# Patient Record
Sex: FEMALE | Race: WHITE
[De-identification: names, ages, dates, MRNs, and addresses within clinical notes are randomized per-mention and may not be internally consistent; named-entity substitution may affect disease eponyms.]

---

## 2020-11-25 ENCOUNTER — HOSPITAL ENCOUNTER (OUTPATIENT)
Dept: HOSPITAL 62 - ER | Age: 80
Setting detail: OBSERVATION
LOS: 2 days | Discharge: HOME | End: 2020-11-27
Attending: INTERNAL MEDICINE | Admitting: INTERNAL MEDICINE
Payer: MEDICARE

## 2020-11-25 DIAGNOSIS — Z95.1: ICD-10-CM

## 2020-11-25 DIAGNOSIS — E66.9: ICD-10-CM

## 2020-11-25 DIAGNOSIS — R73.9: ICD-10-CM

## 2020-11-25 DIAGNOSIS — F03.90: ICD-10-CM

## 2020-11-25 DIAGNOSIS — Z82.49: ICD-10-CM

## 2020-11-25 DIAGNOSIS — I16.1: Primary | ICD-10-CM

## 2020-11-25 DIAGNOSIS — E78.5: ICD-10-CM

## 2020-11-25 DIAGNOSIS — Z91.14: ICD-10-CM

## 2020-11-25 DIAGNOSIS — R07.89: ICD-10-CM

## 2020-11-25 DIAGNOSIS — I25.709: ICD-10-CM

## 2020-11-25 DIAGNOSIS — I25.2: ICD-10-CM

## 2020-11-25 LAB
ADD MANUAL DIFF: NO
ALBUMIN SERPL-MCNC: 4.3 G/DL (ref 3.5–5)
ALP SERPL-CCNC: 96 U/L (ref 38–126)
ANION GAP SERPL CALC-SCNC: 8 MMOL/L (ref 5–19)
AST SERPL-CCNC: 18 U/L (ref 14–36)
BASOPHILS # BLD AUTO: 0.1 10^3/UL (ref 0–0.2)
BASOPHILS NFR BLD AUTO: 1 % (ref 0–2)
BILIRUB DIRECT SERPL-MCNC: 0.1 MG/DL (ref 0–0.4)
BILIRUB SERPL-MCNC: 0.3 MG/DL (ref 0.2–1.3)
BUN SERPL-MCNC: 18 MG/DL (ref 7–20)
CALCIUM: 10.4 MG/DL (ref 8.4–10.2)
CHLORIDE SERPL-SCNC: 107 MMOL/L (ref 98–107)
CK MB SERPL-MCNC: 1.15 NG/ML (ref ?–4.55)
CK SERPL-CCNC: 59 U/L (ref 30–135)
CO2 SERPL-SCNC: 24 MMOL/L (ref 22–30)
EOSINOPHIL # BLD AUTO: 0.1 10^3/UL (ref 0–0.6)
EOSINOPHIL NFR BLD AUTO: 1.7 % (ref 0–6)
ERYTHROCYTE [DISTWIDTH] IN BLOOD BY AUTOMATED COUNT: 15.2 % (ref 11.5–14)
GLUCOSE SERPL-MCNC: 157 MG/DL (ref 75–110)
HCT VFR BLD CALC: 37.1 % (ref 36–47)
HGB BLD-MCNC: 12.3 G/DL (ref 12–15.5)
LYMPHOCYTES # BLD AUTO: 1.5 10^3/UL (ref 0.5–4.7)
LYMPHOCYTES NFR BLD AUTO: 29.2 % (ref 13–45)
MCH RBC QN AUTO: 27 PG (ref 27–33.4)
MCHC RBC AUTO-ENTMCNC: 33.3 G/DL (ref 32–36)
MCV RBC AUTO: 81 FL (ref 80–97)
MONOCYTES # BLD AUTO: 0.3 10^3/UL (ref 0.1–1.4)
MONOCYTES NFR BLD AUTO: 5.4 % (ref 3–13)
NEUTROPHILS # BLD AUTO: 3.2 10^3/UL (ref 1.7–8.2)
NEUTS SEG NFR BLD AUTO: 62.7 % (ref 42–78)
PLATELET # BLD: 116 10^3/UL (ref 150–450)
POTASSIUM SERPL-SCNC: 4 MMOL/L (ref 3.6–5)
PROT SERPL-MCNC: 7.2 G/DL (ref 6.3–8.2)
RBC # BLD AUTO: 4.57 10^6/UL (ref 3.72–5.28)
TOTAL CELLS COUNTED % (AUTO): 100 %
TROPONIN I SERPL-MCNC: < 0.012 NG/ML
WBC # BLD AUTO: 5.1 10^3/UL (ref 4–10.5)

## 2020-11-25 PROCEDURE — 80048 BASIC METABOLIC PNL TOTAL CA: CPT

## 2020-11-25 PROCEDURE — 80061 LIPID PANEL: CPT

## 2020-11-25 PROCEDURE — 99285 EMERGENCY DEPT VISIT HI MDM: CPT

## 2020-11-25 PROCEDURE — G0378 HOSPITAL OBSERVATION PER HR: HCPCS

## 2020-11-25 PROCEDURE — 93005 ELECTROCARDIOGRAM TRACING: CPT

## 2020-11-25 PROCEDURE — 85025 COMPLETE CBC W/AUTO DIFF WBC: CPT

## 2020-11-25 PROCEDURE — 84484 ASSAY OF TROPONIN QUANT: CPT

## 2020-11-25 PROCEDURE — 93010 ELECTROCARDIOGRAM REPORT: CPT

## 2020-11-25 PROCEDURE — 84443 ASSAY THYROID STIM HORMONE: CPT

## 2020-11-25 PROCEDURE — 82553 CREATINE MB FRACTION: CPT

## 2020-11-25 PROCEDURE — 82550 ASSAY OF CK (CPK): CPT

## 2020-11-25 PROCEDURE — 71275 CT ANGIOGRAPHY CHEST: CPT

## 2020-11-25 PROCEDURE — 83036 HEMOGLOBIN GLYCOSYLATED A1C: CPT

## 2020-11-25 PROCEDURE — 36415 COLL VENOUS BLD VENIPUNCTURE: CPT

## 2020-11-25 PROCEDURE — 80053 COMPREHEN METABOLIC PANEL: CPT

## 2020-11-25 PROCEDURE — 71045 X-RAY EXAM CHEST 1 VIEW: CPT

## 2020-11-25 NOTE — RADIOLOGY REPORT (SQ)
EXAM DESCRIPTION: 



XR CHEST 1 VIEW



COMPLETED DATE/TME:  11/25/2020 19:54



CLINICAL HISTORY: 



80 years, Female, chest pain 



COMPARISON:

None.



NUMBER OF VIEWS:

1



TECHNIQUE:

Portable AP upright view of the chest was obtained at 7:50 PM.



LIMITATIONS:

None.



FINDINGS:



The heart size is within normal limits for technique. There is

either focal diaphragmatic eventration or small focal

diaphragmatic hernia medial left lower chest. There is no

airspace consolidation. Patient is status post coronary artery

bypass graft surgery. There is no evidence of pleural effusion or

pneumothorax. No definite acute bony abnormality is seen.



IMPRESSION:



No acute abnormality as above.

 



copyright 2011 [employer]- All Rights Reserved

## 2020-11-25 NOTE — PDOC H&P
History of Present Illness


Admission Date/PCP: 


  11/25/20 22:58





  TARA CREWS MD





Patient complains of: Chest pain


History of Present Illness: 


EILEEN KRISHNAMURTHY is a 80 year old female with history of hypertension, CAD S/P CABG

[2016 [facility]], dementia, depression, who presents to the hospital via EMS for 

evaluation of acute onset chest pain.  Patient's chest pain began today.  She 

described it like a pressure and tightness.  Patient herself is a poor 

historian.  She did inform her daughter and her daughter subsequently called 

EMS.  Patient endorsed having some shortness of breath at the time.  Currently 

patient still notes mild heaviness in the chest but mostly resolved.  Of note, 

patient has not been on any medications for the past several months including h

er blood pressure medications.  The daughter does not know what blood pressure 

medicationS she was previously on.  On arrival of the EMS, her blood pressure 

was noted to be 220/76.  She was given some nitro and subsequently placed on a 

nitro drip.  On arrival at the ER, patient's nitro drip was discontinued as her 

blood pressure had normalized.  








Past Medical History


Cardiac Medical History: Reports: Myocardial Infarction, Hyperlipidema, 

Hypertension


Psychiatric Medical History: Reports: Depression





Past Surgical History


Past Surgical History: Reports: Appendectomy, Cholecystectomy, Hysterectomy, 

Orthopedic Surgery - L femor, R big toe





Social History


Lives with: Family


Smoking Status: Unknown if Ever Smoked


Electronic Cigarette use?: No


Frequency of Alcohol Use: None


Hx Recreational Drug Use: No





- Advance Directive


Resuscitation Status: Full Code


Surrogate healthcare decision maker:: 





Dayna





Family History


Family History: Hypertension


Parental Family History Reviewed: Yes


Children Family History Reviewed: Yes


Sibling(s) Family History Reviewed.: Yes





Medication/Allergy


Allergies/Adverse Reactions: 


                                        





Sulfa (Sulfonamide Antibiotics) Allergy (Verified 11/25/20 21:22)


   











Review of Systems


Constitutional: ABSENT: chills, fever(s), weakness


Eyes: ABSENT: visual disturbances


Nose, Mouth, and Throat: ABSENT: headache(s), sore throat


Cardiovascular: PRESENT: chest pain


Respiratory: ABSENT: cough


Gastrointestinal: ABSENT: abdominal pain, nausea, vomiting


Genitourinary: ABSENT: difficulty urinating, dysuria


Musculoskeletal: ABSENT: back pain


Integumentary: ABSENT: diaphoresis


Neurological: ABSENT: dizziness


Psychiatric: PRESENT: depression - Daughter reports history of depression.  

ABSENT: anxiety


Endocrine: ABSENT: polyuria


Hematologic/Lymphatic: ABSENT: easy bleeding





Physical Exam


Vital Signs: 


                                        











Temp Pulse Resp BP Pulse Ox


 


 98.3 F      16   138/55 H  99 


 


 11/25/20 18:53     11/25/20 22:01  11/25/20 22:01  11/25/20 22:01








                                 Intake & Output











 11/24/20 11/25/20 11/26/20





 06:59 06:59 06:59


 


Weight   63.6 kg











General appearance: PRESENT: no acute distress, cooperative


Head exam: PRESENT: normocephalic


Mouth exam: ABSENT: neck supple


Neck exam: ABSENT: JVD


Respiratory exam: PRESENT: clear to auscultation adela, symmetrical, unlabored.  

ABSENT: tachypnea, wheezes


Cardiovascular exam: PRESENT: diastolic murmur, RRR, +S1, +S2.  ABSENT: systolic

murmur, tachycardia


GI/Abdominal exam: PRESENT: soft.  ABSENT: rebound, rigid, tenderness


Extremities exam: ABSENT: calf tenderness, pedal edema


Neurological exam: PRESENT: alert, awake, oriented to person, oriented to place.

 ABSENT: oriented to situation


Psychiatric exam: ABSENT: agitated, anxious


Focused psych exam: ABSENT: pressured speech





Results


Laboratory Results: 


                                        





                                 11/25/20 18:56 





                                 11/25/20 18:56 





                                        











  11/25/20 11/25/20





  18:56 18:56


 


WBC  5.1 


 


RBC  4.57 


 


Hgb  12.3 


 


Hct  37.1 


 


MCV  81 


 


MCH  27.0 


 


MCHC  33.3 


 


RDW  15.2 H 


 


Plt Count  116 L 


 


Seg Neutrophils %  62.7 


 


Sodium   138.9


 


Potassium   4.0


 


Chloride   107


 


Carbon Dioxide   24


 


Anion Gap   8


 


BUN   18


 


Creatinine   0.96


 


Est GFR (African Amer)   > 60


 


Glucose   157 H


 


Calcium   10.4 H


 


Total Bilirubin   0.3


 


AST   18


 


Alkaline Phosphatase   96


 


Total Protein   7.2


 


Albumin   4.3








                                        











  11/25/20 11/25/20 11/25/20





  18:56 18:56 22:15


 


Creatine Kinase  59  


 


CK-MB (CK-2)   1.15 


 


Troponin I   < 0.012  < 0.012











Impressions: 


                                        





Chest X-Ray  11/25/20 19:18


IMPRESSION:


 


No acute abnormality as above.


 


 


copyright 2011 [employer]- All Rights Reserved


 














Assessment and Plan





- Diagnosis


(1) Hypertensive emergency


Is this a current diagnosis for this admission?: Yes   


Plan: 


Secondary to noncompliance with antihypertensive regimen.


Patient initially hypertensive emergency as characterized by severe hypertension

with chest pain and dyspnea.


Currently off the nitroglycerin drip 


As she has not been as any antihypertensives for several months over a year now 

and no one knows her prior BP meds, I will go ahead and start her on losartan 

and chlorthalidone right away.


Admitted for observation.








(2) Chest pain at rest


Is this a current diagnosis for this admission?: Yes   


Plan: 


Due to malignant hypertension.  Chest pain has currently resolved mostly down to

1/5.


Troponin is negative x2.


Monitor on telemetry


Nitroglycerin as needed


Optimize blood pressure.








(3) CAD (coronary artery disease)


Is this a current diagnosis for this admission?: Yes   


Plan: 


Has history of MI with CABG in 2016.  Not on any medications and has not seen 

any doctor in quite some time now.  We will start her on a baby aspirin.  Check 

lipid panel.








(4) Hyperglycemia


Is this a current diagnosis for this admission?: Yes   


Plan: 


Denies history of diabetes.  Check hemoglobin A1c.








- Time


Time Spent with patient: 35 or more minutes


Anticipated Discharge Disposition: Home, Self Care


Anticipated Discharge Timeframe: within 24 hours

## 2020-11-25 NOTE — ER DOCUMENT REPORT
Entered by EWA DELVALLE SCRIBE  11/25/20 1952 





Acting as scribe for:JOSE MARIA FERREIRA, DO





ED General





- General


Chief Complaint: Chest Pain


Stated Complaint: CHEST PAIN


Time Seen by Provider: 11/25/20 19:42


Primary Care Provider: 


TARA CREWS MD [Primary Care Provider] - Follow up as needed


Information source: Patient


Notes: 





This 80 year old female patient presents to the emergency department today with 

complaints of chest pain. Patient states at 5 pm she had sudden tightness to the

center of her chest that did not radiate. Patient states she was laying on her b

ed when this began. Patient was administered aspirin and a nitro drip by EMS, 

her pain lessened, and states she feels better now. Patient reports history of 

open heart surgery, MI, HTN, HLD, triple by-pass, and states she cannot remember

when this occurred but it has been a while. Denies recent illness, cough, or 

covid exposure. 





- Related Data


Allergies/Adverse Reactions: 


                                        





Sulfa (Sulfonamide Antibiotics) Allergy (Verified 11/25/20 21:22)


   











Past Medical History





- General


Information source: Patient





- Social History


Smoking Status: Unknown if Ever Smoked


Lives with: Family


Family History: Reviewed & Not Pertinent





- Past Medical History


Cardiac Medical History: Reports: Hx Heart Attack, Hx Hypercholesterolemia, Hx 

Hypertension


Psychiatric Medical History: Reports: Hx Depression


Past Surgical History: Reports: Hx Appendectomy, Hx Cholecystectomy, Hx 

Hysterectomy, Hx Open Heart Surgery





Review of Systems





- Review of Systems


Constitutional: See HPI.  denies: Recent illness


EENT: No symptoms reported


Cardiovascular: See HPI, Chest pain


Respiratory: See HPI.  denies: Cough


Gastrointestinal: No symptoms reported


Genitourinary: No symptoms reported


Female Genitourinary: No symptoms reported


Musculoskeletal: No symptoms reported


Skin: No symptoms reported


Hematologic/Lymphatic: No symptoms reported


Neurological/Psychological: No symptoms reported


-: Yes All other systems reviewed and negative





Physical Exam





- Vital signs


Vitals: 


                                        











Temp


 


 98.3 F 


 


 11/25/20 18:53














- General


Notes: 


Alert. Frail and elderly appearance. 





- HEENT


Head: Normocephalic, Atraumatic


Eyes: Normal


Pupils: PERRL





- Respiratory


Respiratory status: No respiratory distress


Chest status: Nontender


Breath sounds: Normal


Chest palpation: Normal


Notes: 


Midline scar to the anterior chest. 





- Cardiovascular


Rhythm: Regular


Heart sounds: Normal auscultation


Murmur: No





- Abdominal


Inspection: Obese, Other - soft


Distension: No distension


Bowel sounds: Normal


Tenderness: Nontender





- Extremities


General upper extremity: Normal inspection, Normal ROM


General lower extremity: Normal inspection, Normal ROM.  No: Edema





- Neurological


Neuro grossly intact: Yes


Cognition: Normal


Orientation: AAOx4


Shelia Coma Scale Eye Opening: Spontaneous


Shelia Coma Scale Verbal: Oriented


Shelia Coma Scale Motor: Obeys Commands


Adkins Coma Scale Total: 15


Speech: Normal


Motor strength normal: LUE, RUE, LLE, RLE


Sensory: Normal





- Psychological


Associated symptoms: Normal affect, Normal mood





- Skin


Skin Temperature: Warm


Skin Moisture: Dry


Skin Color: Normal





Course





- Re-evaluation


Re-evalutation: 





11/25/20 22:15


MDM  80 year old female is a difficult historian and experienced chest pain at 

home earlier accompanied by accelerated htn.  She is taking no medicine though 

she is supposed to be.  She has had a CABG previously at [facility] and was 

taking htn medicine but not taking at this time.  She was administered aspirin -

325 mg by EMS and then ntg - sublingal which helped and then a ntg gtt was 

initiated.  She was painfree when she arrived here and sbp 140 upon arrival.  

Her SBP was 210 at home.  She remains pain free here.  I have discussed the pt 

with Dr. Cervantes and he has graciously agreed to see and evaluate for admission.  





- Vital Signs


Vital signs: 


                                        











Temp Pulse Resp BP Pulse Ox


 


 98.3 F      14   126/49 H  98 


 


 11/25/20 18:53     11/25/20 21:16  11/25/20 21:16  11/25/20 21:16














- Laboratory


Result Diagrams: 


                                 11/25/20 18:56





                                 11/25/20 18:56


Laboratory results interpreted by me: 


                                        











  11/25/20 11/25/20





  18:56 18:56


 


RDW  15.2 H 


 


Plt Count  116 L 


 


Est GFR (MDRD) Non-Af   56 L


 


Glucose   157 H


 


Calcium   10.4 H














Discharge





- Discharge


Clinical Impression: 


 Chest pain at rest





Hypertension


Qualifiers:


 Hypertension type: unspecified Qualified Code(s): I10 - Essential (primary) 

hypertension





Condition: Stable


Disposition: ADMITTED AS OBSERVATION


Admitting Provider: Helen (Hospitalist)


Unit Admitted: Telemetry


Referrals: 


TARA CREWS MD [Primary Care Provider] - Follow up as needed





I personally performed the services described in the documentation, reviewed and

edited the documentation which was dictated to the scribe in my presence, and it

accurately records my words and actions.

## 2020-11-25 NOTE — ADVANCED CARE
- Diagnosis


(1) Hypertensive emergency


Diagnosis Current: Yes   





(2) Chest pain at rest


Diagnosis Current: Yes   





(3) CAD (coronary artery disease)


Diagnosis Current: Yes   





Resuscitation Status: Full Code


Discussion: 





I discussed with patient's daughter Dayna is patient's next of kin and lives 

with patient.  Patient has history of dementia.  Patient's daughter informs me 

that she would like her to be a full CODE STATUS but does not want prolonged 

code as she does not want her to be resuscitated as a 'vegetable'.  After 

elaborate discussion and explanation, she did state that she does not want CPR 

past 8 minutes if no ROSC is achieved.  Otherwise wants full treatment as deemed

necessary.


Time Spent: 17mins

## 2020-11-25 NOTE — EKG REPORT
SEVERITY:- ABNORMAL ECG -

SINUS RHYTHM

LEFT VENTRICULAR HYPERTROPHY

:

Confirmed by: Tomas Hopkins MD 25-Nov-2020 19:33:56

## 2020-11-26 LAB
ANION GAP SERPL CALC-SCNC: 9 MMOL/L (ref 5–19)
BUN SERPL-MCNC: 17 MG/DL (ref 7–20)
CALCIUM: 9.9 MG/DL (ref 8.4–10.2)
CHLORIDE SERPL-SCNC: 109 MMOL/L (ref 98–107)
CHOLEST SERPL-MCNC: 194.4 MG/DL (ref 0–200)
CO2 SERPL-SCNC: 21 MMOL/L (ref 22–30)
GLUCOSE SERPL-MCNC: 151 MG/DL (ref 75–110)
LDLC SERPL DIRECT ASSAY-MCNC: 143 MG/DL (ref ?–100)
POTASSIUM SERPL-SCNC: 4.1 MMOL/L (ref 3.6–5)
TRIGL SERPL-MCNC: 155 MG/DL (ref ?–150)
VLDLC SERPL CALC-MCNC: 31 MG/DL (ref 10–31)

## 2020-11-26 RX ADMIN — Medication SCH ML: at 21:38

## 2020-11-26 RX ADMIN — LOSARTAN POTASSIUM SCH MG: 50 TABLET, FILM COATED ORAL at 10:55

## 2020-11-26 RX ADMIN — CHLORTHALIDONE SCH MG: 25 TABLET ORAL at 11:00

## 2020-11-26 RX ADMIN — Medication SCH: at 18:23

## 2020-11-26 RX ADMIN — ENOXAPARIN SODIUM SCH: 40 INJECTION SUBCUTANEOUS at 10:55

## 2020-11-26 RX ADMIN — Medication SCH ML: at 05:32

## 2020-11-26 RX ADMIN — ASPIRIN SCH MG: 81 TABLET, COATED ORAL at 10:54

## 2020-11-26 NOTE — EKG REPORT
SEVERITY:- ABNORMAL ECG -

SINUS BRADYCARDIA

LEFT VENTRICULAR HYPERTROPHY

:

Confirmed by: Tomas Hopkins MD 26-Nov-2020 09:17:10

## 2020-11-26 NOTE — RADIOLOGY REPORT (SQ)
EXAM DESCRIPTION:

 CTA CHEST



CLINICAL HISTORY: 

80 years  Female;  chest pain



TECHNIQUE: 

CT angiogram of the chest using intravenous contrast.. MIP

reconstructions were performed.  

All CT scans at this facility use dose modulation, iterative

reconstruction, and/or weight based dosing when appropriate to

reduce radiation dose to as low as reasonably achievable.



COMPARISON: None.



FINDINGS:

Chest:

Vascular:  Exam is of diagnostic quality. There is no evidence of

pulmonary artery embolization. Scattered vascular plaque is

present in the aorta. No aneurysm. There is postoperative change

of sternotomy and CABG. No dissection.

Lungs:  Lungs are clear. No focal consolidation. No pneumothorax

or pleural effusion. In the posterior aspect of the left upper

lobe along the fissure is a 4 mm pulmonary nodule. There is a

smaller adjacent 2 mm nodule. A 2 mm nodules present in the right

upper lobe and there are scattered very small pulmonary nodules

seen throughout the lungs bilaterally.

Mediastinum:  Mild four-chamber cardiac enlargement. Coronary

artery calcifications. No significant mediastinal or hilar

lymphadenopathy.

Bones and soft tissues:  Compression fractures noted at T10 with

70% loss of height of the vertebral body. Age unknown. There is

degenerative endplate changes with vacuum disc and sclerosis at

T12-L1. The sternum is healed.

Upper Abdomen:  Prominence of the common bile duct is identified

which measures 16 mm. The etiology is unknown.





IMPRESSION:



1. No pulmonary artery embolization.

2. Multiple small bilateral pulmonary nodules which measure up to

4 mm. No follow-up is indicated.

3. No acute process in the chest.

4. Biliary dilatation of uncertain etiology.

## 2020-11-27 VITALS — SYSTOLIC BLOOD PRESSURE: 125 MMHG | DIASTOLIC BLOOD PRESSURE: 44 MMHG

## 2020-11-27 RX ADMIN — ENOXAPARIN SODIUM SCH MG: 40 INJECTION SUBCUTANEOUS at 10:17

## 2020-11-27 RX ADMIN — ASPIRIN SCH MG: 81 TABLET, COATED ORAL at 10:17

## 2020-11-27 RX ADMIN — LOSARTAN POTASSIUM SCH MG: 50 TABLET, FILM COATED ORAL at 10:14

## 2020-11-27 RX ADMIN — Medication SCH: at 14:08

## 2020-11-27 RX ADMIN — CHLORTHALIDONE SCH MG: 25 TABLET ORAL at 08:55

## 2020-11-27 RX ADMIN — Medication SCH ML: at 05:37

## 2020-11-28 NOTE — PDOC PROGRESS REPORT
Subjective


Date:: 11/26/20


Subjective:: 





Patient was seen and examined at bedside. minimal chest pain no SOB. BP was down

to 130/80. No new complains


Reason For Visit: 


HTN EMERGENCY,CHEST PAIN








Physical Exam


Vital Signs: 


                                        











Temp Pulse Resp BP Pulse Ox


 


 97.8 F   62   17   125/44 L  100 


 


 11/27/20 12:33  11/27/20 12:33  11/27/20 12:33  11/27/20 12:33  11/27/20 12:33








                                 Intake & Output











 11/27/20 11/28/20 11/29/20





 06:59 06:59 06:59


 


Intake Total 580 120 


 


Balance 580 120 


 


Weight 65 kg  











General appearance: PRESENT: no acute distress, cooperative


Head exam: PRESENT: atraumatic, normocephalic


Eye exam: PRESENT: EOMI, PERRLA


Mouth exam: PRESENT: moist


Neck exam: PRESENT: full ROM


Respiratory exam: PRESENT: clear to auscultation adela, symmetrical, unlabored


Cardiovascular exam: PRESENT: RRR, +S1, +S2


Pulses: PRESENT: +2 pedal pulses bilateral


GI/Abdominal exam: PRESENT: normal bowel sounds, soft.  ABSENT: rebound, 

tenderness


Extremities exam: PRESENT: full ROM


Musculoskeletal exam: PRESENT: full ROM


Neurological exam: PRESENT: alert, awake, oriented to person, oriented to place,

oriented to time, oriented to situation


Psychiatric exam: PRESENT: normal mood


Skin exam: PRESENT: normal color





Results


Laboratory Results: 


                                        





                                 11/25/20 18:56 





                                 11/26/20 06:18 





                                        











  11/25/20 11/25/20 11/25/20





  18:56 18:56 22:15


 


Creatine Kinase  59  


 


CK-MB (CK-2)   1.15 


 


Troponin I   < 0.012  < 0.012











Impressions: 


                                        





Chest X-Ray  11/25/20 19:18


IMPRESSION:


 


No acute abnormality as above.


 


 


copyright 2011 [employer]- All Rights Reserved


 








Chest/Abdomen CTA  11/26/20 00:00


IMPRESSION:


 


1. No pulmonary artery embolization.


2. Multiple small bilateral pulmonary nodules which measure up to


4 mm. No follow-up is indicated.


3. No acute process in the chest.


4. Biliary dilatation of uncertain etiology.


 


 


 














Assessment and Plan





- Diagnosis


(1) CAD (coronary artery disease)


Qualifiers: 


   Coronary Disease-Associated Artery/Lesion type: bypass graft   Associated ang

malick: with stable angina 


Is this a current diagnosis for this admission?: Yes   


Plan: 


Has history of MI with CABG in 2016.  Not on any medications and has not seen 

any doctor in quite some time now.  We will start her on a baby aspirin. 


- lipid panel with elevated LDL and total hannah


- started on aspirin, metoprolol and ARB








(2) Chest pain at rest


Is this a current diagnosis for this admission?: Yes   


Plan: 


Due to malignant hypertension.  Chest pain has currently resolved mostly down to

1/5.


Troponin is negative x2.


Monitor on telemetry


Nitroglycerin as needed


Optimize blood pressure.








(3) Hyperglycemia


Is this a current diagnosis for this admission?: Yes   


Plan: 


Denies history of diabetes.  Check hemoglobin A1c.








(4) Hypertension


Qualifiers: 


   Hypertension type: unspecified   Qualified Code(s): I10 - Essential (primary)

hypertension   


Is this a current diagnosis for this admission?: Yes   


Plan: 


- was not on any meds prior to admission


- started on losartan, chlorthalidone 








(5) Hypertensive emergency


Is this a current diagnosis for this admission?: Yes   


Plan: 


Secondary to noncompliance with antihypertensive regimen.


Patient initially hypertensive emergency as characterized by severe hypertension

with chest pain and dyspnea.


Currently off the nitroglycerin drip 


As she has not been as any antihypertensives for several months over a year now 

and no one knows her prior BP meds, I will go ahead and start her on losartan 

and chlorthalidone right away.











- Time


Time Spent with patient: 25-34 minutes


Medications reviewed and adjusted accordingly: Yes


Anticipated Discharge Disposition: Home, Self Care


Anticipated Discharge Timeframe: within 48 hours

## 2020-11-28 NOTE — PDOC DISCHARGE SUMMARY
Impression





- Admit/DC Date/PCP


Admission Date/Primary Care Provider: 


  11/25/20 22:58





  TARA CREWS MD





Discharge Date: 11/27/20





- Discharge Diagnosis


(1) CAD (coronary artery disease)


Is this a current diagnosis for this admission?: Yes   





(2) Chest pain at rest


Is this a current diagnosis for this admission?: Yes   





(3) Hyperglycemia


Is this a current diagnosis for this admission?: Yes   





(4) Hypertension


Is this a current diagnosis for this admission?: Yes   





(5) Hypertensive emergency


Is this a current diagnosis for this admission?: Yes   





- Additional Information


Resuscitation Status: Full Code


Discharge Diet: Cardiac


Discharge Activity: Activity As Tolerated


Referrals: 


TARA CREWS MD [Primary Care Provider] - Follow up as needed (PATIENT WILL 

HAVE TO MAKE OWN APPT.)


Prescriptions: 


Losartan Potassium [Cozaar 50 mg Tablet] 50 mg PO DAILY 60 Days #60 tablet


Aspirin [Ecotrin 81 mg EC Tablet] 81 mg PO DAILY 60 Days #60 tabec


Chlorthalidone [Hygroton 25 mg Tablet] 25 mg PO QAM 30 Days #30 tablet


Atorvastatin Calcium [Lipitor 40 mg Tablet] 40 mg PO QHS 60 Days #60 tablet


Nitroglycerin [Nitrostat 0.4 mg (1/150 Gr) Tabs 25/Bottle] 1 tab SL Q5MP PRN 30 

Days #10 bottle


 PRN Reason: 


Pantoprazole Sodium [Protonix 40 mg Dr Tablet] 40 mg PO NOW 30 Days #30 

tablet.


Home Medications: 








Aspirin [Ecotrin 81 mg EC Tablet] 81 mg PO DAILY 60 Days #60 tabec 11/27/20 


Atorvastatin Calcium [Lipitor 40 mg Tablet] 40 mg PO QHS 60 Days #60 tablet 

11/27/20 


Chlorthalidone [Hygroton 25 mg Tablet] 25 mg PO QAM 30 Days #30 tablet 11/27/20 


Losartan Potassium [Cozaar 50 mg Tablet] 50 mg PO DAILY 60 Days #60 tablet 

11/27/20 


Nitroglycerin [Nitrostat 0.4 mg (1/150 Gr) Tabs 25/Bottle] 1 tab SL Q5MP PRN 30 

Days #10 bottle 11/27/20 


Pantoprazole Sodium [Protonix 40 mg Dr Tablet] 40 mg PO NOW 30 Days #30 

tablet. 11/27/20 











History of Present Illiness


History of Present Illness: 


EILEEN KRISHNAMURTHY is a 80 year old female with history of hypertension, CAD S/P CABG

[2016 [facility]], dementia, depression, who presents to the hospital via EMS for e

valuation of acute onset chest pain.  Patient's chest pain began today.  She 

described it like a pressure and tightness.  Patient herself is a poor 

historian.  She did inform her daughter and her daughter subsequently called 

EMS.  Patient endorsed having some shortness of breath at the time.  Currently 

patient still notes mild heaviness in the chest but mostly resolved.  Of note, 

patient has not been on any medications for the past several months including 

her blood pressure medications.  The daughter does not know what blood pressure 

medicationS she was previously on.  On arrival of the EMS, her blood pressure 

was noted to be 220/76.  She was given some nitro and subsequently placed on a 

nitro drip.  On arrival at the ER, patient's nitro drip was discontinued as her 

blood pressure had normalized.  














Hospital Course


Hospital Course: 


On the second hospital day her blood pressure went down to 130s over 50s and her

chest pain has significantly lessened.  Troponin was negative x2.  On the third 

hospital day her chest pain has completely resolved, CTA chest was done to rule 

out pulmonary embolism and her T CTA chest was negative with no aortic 

dissection as well.  TSH was normal.  She was discharged on blood pressure 

medications and aspirin and was advised to follow-up with her primary care 

physician.





Physical Exam


Vital Signs: 


                                        











Temp Pulse Resp BP Pulse Ox


 


 97.8 F   62   17   125/44 L  100 


 


 11/27/20 12:33  11/27/20 12:33  11/27/20 12:33  11/27/20 12:33  11/27/20 12:33








                                 Intake & Output











 11/27/20 11/28/20 11/29/20





 06:59 06:59 06:59


 


Intake Total 580 120 


 


Balance 580 120 


 


Weight 65 kg  











General appearance: PRESENT: no acute distress, cooperative


Head exam: PRESENT: atraumatic, normocephalic


Eye exam: PRESENT: EOMI, PERRLA


Mouth exam: PRESENT: moist


Neck exam: PRESENT: full ROM


Respiratory exam: PRESENT: clear to auscultation adela, symmetrical, unlabored


Cardiovascular exam: PRESENT: RRR, +S1, +S2


GI/Abdominal exam: PRESENT: normal bowel sounds, soft.  ABSENT: rebound, 

tenderness


Extremities exam: PRESENT: full ROM


Musculoskeletal exam: PRESENT: full ROM


Neurological exam: PRESENT: alert, awake, oriented to person, oriented to place,

oriented to time, oriented to situation


Psychiatric exam: PRESENT: normal mood


Skin exam: PRESENT: normal color





Results


Laboratory Results: 


                                        











WBC  5.1 10^3/uL (4.0-10.5)   11/25/20  18:56    


 


RBC  4.57 10^6/uL (3.72-5.28)   11/25/20  18:56    


 


Hgb  12.3 g/dL (12.0-15.5)   11/25/20  18:56    


 


Hct  37.1 % (36.0-47.0)   11/25/20  18:56    


 


MCV  81 fl (80-97)   11/25/20  18:56    


 


MCH  27.0 pg (27.0-33.4)   11/25/20  18:56    


 


MCHC  33.3 g/dL (32.0-36.0)   11/25/20  18:56    


 


RDW  15.2 % (11.5-14.0)  H  11/25/20  18:56    


 


Plt Count  116 10^3/uL (150-450)  L  11/25/20  18:56    


 


Lymph % (Auto)  29.2 % (13-45)   11/25/20  18:56    


 


Mono % (Auto)  5.4 % (3-13)   11/25/20  18:56    


 


Eos % (Auto)  1.7 % (0-6)   11/25/20  18:56    


 


Baso % (Auto)  1.0 % (0-2)   11/25/20  18:56    


 


Absolute Neuts (auto)  3.2 10^3/uL (1.7-8.2)   11/25/20  18:56    


 


Absolute Lymphs (auto)  1.5 10^3/uL (0.5-4.7)   11/25/20  18:56    


 


Absolute Monos (auto)  0.3 10^3/uL (0.1-1.4)   11/25/20  18:56    


 


Absolute Eos (auto)  0.1 10^3/uL (0.0-0.6)   11/25/20  18:56    


 


Absolute Basos (auto)  0.1 10^3/uL (0.0-0.2)   11/25/20  18:56    


 


Seg Neutrophils %  62.7 % (42-78)   11/25/20  18:56    


 


Sodium  138.7 mmol/L (137-145)   11/26/20  06:18    


 


Potassium  4.1 mmol/L (3.6-5.0)   11/26/20  06:18    


 


Chloride  109 mmol/L ()  H  11/26/20  06:18    


 


Carbon Dioxide  21 mmol/L (22-30)  L  11/26/20  06:18    


 


Anion Gap  9  (5-19)   11/26/20  06:18    


 


BUN  17 mg/dL (7-20)   11/26/20  06:18    


 


Creatinine  0.88 mg/dL (0.52-1.25)   11/26/20  06:18    


 


Est GFR ( Amer)  > 60  (>60)   11/26/20  06:18    


 


Est GFR (MDRD) Non-Af  > 60  (>60)   11/26/20  06:18    


 


Glucose  151 mg/dL ()  H  11/26/20  06:18    


 


Hemoglobin A1c %  6.7 % (4.7-6.0)  H  11/26/20  06:18    


 


Calcium  9.9 mg/dL (8.4-10.2)   11/26/20  06:18    


 


Total Bilirubin  0.3 mg/dL (0.2-1.3)   11/25/20  18:56    


 


Direct Bilirubin  0.1 mg/dL (0.0-0.4)   11/25/20  18:56    


 


Neonat Total Bilirubin  Not Reportable   11/25/20  18:56    


 


Neonat Direct Bilirubin  Not Reportable   11/25/20  18:56    


 


Neonat Indirect Bili  Not Reportable   11/25/20  18:56    


 


AST  18 U/L (14-36)   11/25/20  18:56    


 


ALT  11 U/L (<35)   11/25/20  18:56    


 


Alkaline Phosphatase  96 U/L ()   11/25/20  18:56    


 


Creatine Kinase  59 U/L ()   11/25/20  18:56    


 


CK-MB (CK-2)  1.15 ng/mL (<4.55)   11/25/20  18:56    


 


Troponin I  < 0.012 ng/mL  11/25/20  22:15    


 


Total Protein  7.2 g/dL (6.3-8.2)   11/25/20  18:56    


 


Albumin  4.3 g/dL (3.5-5.0)   11/25/20  18:56    


 


Triglycerides  155 mg/dL (<150)  H  11/26/20  06:18    


 


Cholesterol  194.40 mg/dL (0-200)   11/26/20  06:18    


 


LDL Cholesterol Direct  143 mg/dL (<100)  H  11/26/20  06:18    


 


VLDL Cholesterol  31.0 mg/dL (10-31)   11/26/20  06:18    


 


HDL Cholesterol  42 mg/dL (>40)   11/26/20  06:18    


 


TSH  6.19 uIU/mL (0.47-4.68)  H  11/26/20  06:18    








                                        











  11/25/20 11/25/20





  18:56 22:15


 


CK-MB (CK-2)  1.15 


 


Troponin I  < 0.012  < 0.012











Impressions: 


                                        





Chest X-Ray  11/25/20 19:18


IMPRESSION:


 


No acute abnormality as above.


 


 


copyright 2011 [employer]- All Rights Reserved


 








Chest/Abdomen CTA  11/26/20 00:00


IMPRESSION:


 


1. No pulmonary artery embolization.


2. Multiple small bilateral pulmonary nodules which measure up to


4 mm. No follow-up is indicated.


3. No acute process in the chest.


4. Biliary dilatation of uncertain etiology.


 


 


 














Stroke


Is this a Stroke Patient?: No





Acute Heart Failure


Is this a Heart Failure Patient?: No

## 2020-11-30 ENCOUNTER — HOSPITAL ENCOUNTER (EMERGENCY)
Dept: HOSPITAL 62 - ER | Age: 80
LOS: 1 days | Discharge: HOME | End: 2020-12-01
Payer: MEDICARE

## 2020-11-30 DIAGNOSIS — R73.9: ICD-10-CM

## 2020-11-30 DIAGNOSIS — I10: ICD-10-CM

## 2020-11-30 DIAGNOSIS — I25.10: ICD-10-CM

## 2020-11-30 DIAGNOSIS — Z88.2: ICD-10-CM

## 2020-11-30 DIAGNOSIS — I25.2: ICD-10-CM

## 2020-11-30 DIAGNOSIS — Z95.5: ICD-10-CM

## 2020-11-30 DIAGNOSIS — F03.90: ICD-10-CM

## 2020-11-30 DIAGNOSIS — D69.6: Primary | ICD-10-CM

## 2020-11-30 LAB
ADD MANUAL DIFF: NO
ALBUMIN SERPL-MCNC: 4.4 G/DL (ref 3.5–5)
ALP SERPL-CCNC: 121 U/L (ref 38–126)
ANION GAP SERPL CALC-SCNC: 11 MMOL/L (ref 5–19)
AST SERPL-CCNC: 54 U/L (ref 14–36)
BASOPHILS # BLD AUTO: 0.1 10^3/UL (ref 0–0.2)
BASOPHILS NFR BLD AUTO: 0.6 % (ref 0–2)
BILIRUB DIRECT SERPL-MCNC: 0.1 MG/DL (ref 0–0.4)
BILIRUB SERPL-MCNC: 0.4 MG/DL (ref 0.2–1.3)
BUN SERPL-MCNC: 27 MG/DL (ref 7–20)
CALCIUM: 10.2 MG/DL (ref 8.4–10.2)
CHLORIDE SERPL-SCNC: 105 MMOL/L (ref 98–107)
CO2 SERPL-SCNC: 21 MMOL/L (ref 22–30)
EOSINOPHIL # BLD AUTO: 0.1 10^3/UL (ref 0–0.6)
EOSINOPHIL NFR BLD AUTO: 0.6 % (ref 0–6)
ERYTHROCYTE [DISTWIDTH] IN BLOOD BY AUTOMATED COUNT: 15.3 % (ref 11.5–14)
GLUCOSE SERPL-MCNC: 178 MG/DL (ref 75–110)
HCT VFR BLD CALC: 37.9 % (ref 36–47)
HGB BLD-MCNC: 12.7 G/DL (ref 12–15.5)
LYMPHOCYTES # BLD AUTO: 1.2 10^3/UL (ref 0.5–4.7)
LYMPHOCYTES NFR BLD AUTO: 11.7 % (ref 13–45)
MCH RBC QN AUTO: 27.5 PG (ref 27–33.4)
MCHC RBC AUTO-ENTMCNC: 33.5 G/DL (ref 32–36)
MCV RBC AUTO: 82 FL (ref 80–97)
MONOCYTES # BLD AUTO: 0.4 10^3/UL (ref 0.1–1.4)
MONOCYTES NFR BLD AUTO: 4.1 % (ref 3–13)
NEUTROPHILS # BLD AUTO: 8.2 10^3/UL (ref 1.7–8.2)
NEUTS SEG NFR BLD AUTO: 83 % (ref 42–78)
PLATELET # BLD: 128 10^3/UL (ref 150–450)
POTASSIUM SERPL-SCNC: 4 MMOL/L (ref 3.6–5)
PROT SERPL-MCNC: 7.2 G/DL (ref 6.3–8.2)
RBC # BLD AUTO: 4.62 10^6/UL (ref 3.72–5.28)
TOTAL CELLS COUNTED % (AUTO): 100 %
WBC # BLD AUTO: 9.9 10^3/UL (ref 4–10.5)

## 2020-11-30 PROCEDURE — 70450 CT HEAD/BRAIN W/O DYE: CPT

## 2020-11-30 PROCEDURE — 93005 ELECTROCARDIOGRAM TRACING: CPT

## 2020-11-30 PROCEDURE — 36415 COLL VENOUS BLD VENIPUNCTURE: CPT

## 2020-11-30 PROCEDURE — 81001 URINALYSIS AUTO W/SCOPE: CPT

## 2020-11-30 PROCEDURE — 84484 ASSAY OF TROPONIN QUANT: CPT

## 2020-11-30 PROCEDURE — 99285 EMERGENCY DEPT VISIT HI MDM: CPT

## 2020-11-30 PROCEDURE — 85025 COMPLETE CBC W/AUTO DIFF WBC: CPT

## 2020-11-30 PROCEDURE — 83735 ASSAY OF MAGNESIUM: CPT

## 2020-11-30 PROCEDURE — 93010 ELECTROCARDIOGRAM REPORT: CPT

## 2020-11-30 PROCEDURE — 80053 COMPREHEN METABOLIC PANEL: CPT

## 2020-11-30 PROCEDURE — 71045 X-RAY EXAM CHEST 1 VIEW: CPT

## 2020-11-30 NOTE — ER DOCUMENT REPORT
Entered by WEA DELVALLE SCRIBE  11/30/20 2128 





Acting as scribe for:JOSE MARIA FERREIRA, 





ED General





- General


Chief Complaint: Shortness Of Breath


Stated Complaint: SHORTNESS OF BREATH


Time Seen by Provider: 11/30/20 20:21


Primary Care Provider: 


TARA CREWS MD [Primary Care Provider] - Follow up as needed


Information source: Patient, Emergency Med Personnel, [facility] Records


Cannot obtain history due to: Dementia


Notes: 





This 80 year old female patient presents to the emergency department today with 

arrival by EMS. Patient states she cannot remember exactly why she is here but 

thinks her daughter called for EMS because she was having chest pain. Denies 

chest pain now. Patient has a history of dementia, HTN, HLD, MI, and CABG. 

Patient was seen in the ED x5 days ago (11/25) for chest pain and was admitted 

until being discharged x2 days ago (11/28). Patient's discharge diagnosis 

included CAD, hyperglycemia, and HTN. Patient states she does not remember being

in the hospital. Per EMS, patient took 81 mg aspirin x4 prior to their arrival 

and EMS personnel administered 0.4 nitro spray sublingual x3 and began a nitro 

drip en route.





- Related Data


Allergies/Adverse Reactions: 


                                        





Sulfa (Sulfonamide Antibiotics) Allergy (Verified 11/30/20 19:03)


   











Past Medical History





- General


Information source: Patient, Emergency Med Personnel, [facility] Records


Cannot obtain history due to: Dementia





- Social History


Smoking Status: Unknown if Ever Smoked


Family History: Hypertension





- Past Medical History


Cardiac Medical History: Reports: Hx Coronary Artery Disease, Hx Heart Attack, 

Hx Hypercholesterolemia, Hx Hypertension


Psychiatric Medical History: Reports: Hx Depression


Past Surgical History: Reports: Hx Appendectomy, Hx Cholecystectomy, Hx Coronary

Artery Bypass Graft, Hx Hysterectomy, Hx Open Heart Surgery, Hx Orthopedic 

Surgery - L femor, R big toe





Review of Systems





- Review of Systems


-: Yes ROS unobtainable due to patient's medical condition





Physical Exam





- Vital signs


Vitals: 


                                        











Temp Resp BP Pulse Ox


 


 98.8 F   17   143/63 H  96 


 


 11/30/20 18:35  11/30/20 18:35  11/30/20 18:35  11/30/20 18:35














- General


Notes: 


Alert. Elderly and frail appearance. 





- HEENT


Head: Normocephalic, Atraumatic


Eyes: Normal


Pupils: PERRL





- Respiratory


Respiratory status: No respiratory distress


Chest status: Nontender


Breath sounds: Normal


Chest palpation: Normal





- Cardiovascular


Rhythm: Regular


Heart sounds: Normal auscultation


Murmur: No





- Abdominal


Inspection: Obese, Other - soft


Distension: No distension


Bowel sounds: Normal


Tenderness: Nontender





- Extremities


General upper extremity: Normal inspection, Normal ROM


General lower extremity: Normal inspection, Normal ROM.  No: Edema





- Neurological


Neuro grossly intact: Yes


M[place]more Coma Scale Eye Opening: Spontaneous


Shelia Coma Scale Verbal: Confused


M[place]more Coma Scale Motor: Obeys Commands


Mentmore Coma Scale Total: 14


Speech: Normal


Sensory: Normal





- Psychological


Associated symptoms: Normal affect, Normal mood





- Skin


Skin Temperature: Warm


Skin Moisture: Dry


Skin Color: Normal





Course





- Re-evaluation


Re-evalutation: 





12/01/20 01:53


MDM  80 year old female who's ability to give a reasonable history is somewhat 

limited due her underlying dementia.  She was recently admitted here (11/25-28) 

and was worked up for poorly controlled htn and chest pain.  CTA was done during

that stay and no evidence of aortic abnormality or pe was noted.  She has no 

pain here and can not tell me, in fact, why she is here.  She is safe for follow

up. 








- Vital Signs


Vital signs: 


                                        











Temp Pulse Resp BP Pulse Ox


 


 98.8 F      14   120/57 L  97 


 


 11/30/20 18:35     12/01/20 02:01  12/01/20 02:01  12/01/20 02:01














- Laboratory


Result Diagrams: 


                                 11/30/20 18:48





                                 11/30/20 18:48


Laboratory results interpreted by me: 


                                        











  11/30/20 11/30/20





  18:48 18:48


 


RDW  15.3 H 


 


Plt Count  128 L 


 


Lymph % (Auto)  11.7 L 


 


Seg Neutrophils %  83.0 H 


 


Carbon Dioxide   21 L


 


BUN   27 H


 


Est GFR ( Amer)   52 L


 


Est GFR (MDRD) Non-Af   43 L


 


Glucose   178 H


 


AST   54 H














- Diagnostic Test


Radiology reviewed: Image reviewed, Reports reviewed





- EKG Interpretation by Me


EKG shows normal: Sinus rhythm


Rate: Normal


Rhythm: NSR - NSR Left axis 72 BPM Poor R wave Progression repolarization 

abnormality without st elevation or depression my interpretation.





Discharge





- Discharge


Clinical Impression: 


 Thrombocytopenia, Hyperglycemia





Hypertension


Qualifiers:


 Hypertension type: unspecified Qualified Code(s): I10 - Essential (primary) 

hypertension





Dementia


Qualifiers:


 Dementia type: unspecified type 





Condition: Stable


Disposition: HOME, SELF-CARE


Instructions:  Dementia ([facility]), High Blood Pressure ([facility]), Thrombocytopenia ([facility])


Additional Instructions: 


See your doctor in follow up.  Take your blood pressure medicine as directed.





Please return here for any problems or any concerns including but not limited to

chest pain, shortness of breath or other concerns. 





See your primary doctor and discuss having an ultrasound of your carotid 

arteries scheduled.





Your platelet count was a bit low,  Have that level rechecked.





Your blood sugar was too high.  Follow your diabetic diet. 


Referrals: 


TARA CREWS MD [Primary Care Provider] - Follow up as needed





I personally performed the services described in the documentation, reviewed and

edited the documentation which was dictated to the scribe in my presence, and it

accurately records my words and actions.

## 2020-11-30 NOTE — RADIOLOGY REPORT (SQ)
EXAM DESCRIPTION:  CHEST SINGLE VIEW



IMAGES COMPLETED DATE/TIME:  11/30/2020 6:58 pm



REASON FOR STUDY:  sob



COMPARISON:  11/25/2020



EXAM PARAMETERS:  NUMBER OF VIEWS: One view.

TECHNIQUE: Single frontal radiographic view of the chest acquired.

RADIATION DOSE: NA

LIMITATIONS: None.



FINDINGS:  LUNGS AND PLEURA: No opacities, masses or pneumothorax. No pleural effusion.

MEDIASTINUM AND HILAR STRUCTURES: No masses.  Contour normal.

HEART AND VASCULAR STRUCTURES: Heart normal in size.  Normal vasculature.

BONES: No acute findings.

HARDWARE: Sternotomy wires.

OTHER: No other significant finding.



IMPRESSION:  NO ACUTE RADIOGRAPHIC FINDING IN THE CHEST.



TECHNICAL DOCUMENTATION:  JOB ID:  [id number]

 2011 [employer]- All Rights Reserved



Reading location - IP/workstation name: [email or web address]

## 2020-12-01 VITALS — SYSTOLIC BLOOD PRESSURE: 120 MMHG | DIASTOLIC BLOOD PRESSURE: 57 MMHG

## 2020-12-01 LAB
APPEARANCE UR: (no result)
APTT PPP: YELLOW S
BILIRUB UR QL STRIP: NEGATIVE
GLUCOSE UR STRIP-MCNC: NEGATIVE MG/DL
KETONES UR STRIP-MCNC: NEGATIVE MG/DL
NITRITE UR QL STRIP: NEGATIVE
PH UR STRIP: 5 [PH] (ref 5–9)
PROT UR STRIP-MCNC: NEGATIVE MG/DL
SP GR UR STRIP: 1.01
UROBILINOGEN UR-MCNC: NEGATIVE MG/DL (ref ?–2)

## 2020-12-01 NOTE — RADIOLOGY REPORT (SQ)
CT HEAD WITHOUT IV CONTRAST



CLINICAL STATEMENT:  dementia



TECHNIQUE: Axial CT images from skull base to vertex without IV

contrast. This exam was performed according to our departmental

dose optimization program, and includes the following measures

where applicable: automated exposure control, adjustment of the

mAs and/or kVp according to patient size and/or exam, and an

iterative reconstruction algorithm.



COMPARISON:



FINDINGS:   There is no acute intracranial hemorrhage, mass, mass

effect or abnormal extra-axial fluid collection. No evidence of

an acute territorial infarct is identified. Patchy foci of

hypoattenuation are noted in the brain parenchyma, nonspecific

but compatible with moderate chronic microvascular angiopathy.

The density in the larger dural venous sinuses is grossly normal.

Atherosclerotic calcifications are present in the cavernous

carotid artery segments bilaterally. There is proportionate

enlargement of the ventricular system and cortical sulci,

compatible with parenchymal volume loss.

Basal ganglion calcifications are noted. There is prominence of

the distal internal carotid artery on the left raising the

possibility of a small aneurysm. No hemorrhage. Vascular

calcifications are noted in the carotid and vertebral arteries.  



Calvaria:  The skull base and calvaria demonstrate no

abnormality. Postsurgical change in the mandible. Lucency is

identified along the right wing of the prosthesis in the

mandible. This may represent loosening.

Paranasal sinuses: Visualized portions of the orbits and

paranasal sinuses are unremarkable. 

skull base: Unremarkable



IMPRESSION:  

1. No intracranial hemorrhage or mass lesion.

2. Age-related volume loss and nonspecific white matter changes.

3. Prominence of the distal left ICA raising the possibility of

subtle aneurysm.

4. Possible loosening of the mandibular prosthesis on the right.

## 2020-12-01 NOTE — EKG REPORT
SEVERITY:- ABNORMAL ECG -

SINUS RHYTHM

LVH WITH SECONDARY REPOLARIZATION ABNORMALITY

:

Confirmed by: Smith Nava MD 01-Dec-2020 09:59:33